# Patient Record
Sex: FEMALE | Race: WHITE | NOT HISPANIC OR LATINO | URBAN - METROPOLITAN AREA
[De-identification: names, ages, dates, MRNs, and addresses within clinical notes are randomized per-mention and may not be internally consistent; named-entity substitution may affect disease eponyms.]

---

## 2020-07-25 ENCOUNTER — TELEPHONE ENCOUNTER (OUTPATIENT)
Dept: URBAN - METROPOLITAN AREA CLINIC 13 | Facility: CLINIC | Age: 30
End: 2020-07-25

## 2020-07-25 RX ORDER — BUSPIRONE HYDROCHLORIDE 5 MG/1
TAKE 1 TABLET TWICE DAILY TABLET ORAL
Qty: 60 | Refills: 3 | OUTPATIENT
Start: 2019-10-14 | End: 2019-11-25

## 2020-07-25 RX ORDER — ARIPIPRAZOLE 10 MG/1
TAKE 1 TABLET DAILY TABLET ORAL
Refills: 0 | OUTPATIENT
End: 2019-11-25

## 2020-07-26 ENCOUNTER — TELEPHONE ENCOUNTER (OUTPATIENT)
Dept: URBAN - METROPOLITAN AREA CLINIC 13 | Facility: CLINIC | Age: 30
End: 2020-07-26

## 2020-07-26 RX ORDER — ALPRAZOLAM 0.5 MG
TAKE 1 TABLET EVERY 6 HOURS PRN TABLET ORAL
Qty: 120 | Refills: 1 | Status: ACTIVE | COMMUNITY

## 2020-07-26 RX ORDER — ALPRAZOLAM 0.25 MG/1
TK 1 T PO QD PRF ANXIETY OR PANIC TABLET ORAL
Qty: 10 | Refills: 0 | Status: ACTIVE | COMMUNITY
Start: 2019-10-16

## 2020-07-26 RX ORDER — BREXPIPRAZOLE 1 MG/1
TAKE 1 TABLET DAILY TABLET ORAL
Refills: 0 | Status: ACTIVE | COMMUNITY

## 2020-07-26 RX ORDER — SERTRALINE HCL 100 MG
TAKE 2 TABLET DAILY TABLET ORAL
Refills: 0 | Status: ACTIVE | COMMUNITY

## 2020-07-26 RX ORDER — ARIPIPRAZOLE 2 MG/1
TK 1 T PO HS FOR 7 DAYS THEN STOP TABLET ORAL
Qty: 7 | Refills: 0 | Status: ACTIVE | COMMUNITY
Start: 2019-10-16

## 2020-07-26 RX ORDER — NORETHINDRONE ACETATE AND ETHINYL ESTRADIOL AND FERROUS FUMARATE 1MG-20(21)
TK 1 T PO QD KIT ORAL
Qty: 84 | Refills: 0 | Status: ACTIVE | COMMUNITY
Start: 2019-05-16

## 2020-07-26 RX ORDER — BUSPIRONE HYDROCHLORIDE 15 MG/1
TAKE 1 TABLET TWICE DAILY TABLET ORAL
Qty: 60 | Refills: 2 | Status: ACTIVE | COMMUNITY

## 2020-07-26 RX ORDER — NORETHINDRONE ACETATE/ETHINYL ESTRADIOL 1.5-0.03MG
TAKE 1 TABLET DAILY AS DIRECTED KIT ORAL
Refills: 0 | Status: ACTIVE | COMMUNITY

## 2020-07-26 RX ORDER — ONDANSETRON 4 MG/1
TAKE 1 TABLET EVERY 8 HOURS PRN NAUSEA TABLET, ORALLY DISINTEGRATING ORAL
Qty: 28 | Refills: 1 | Status: ACTIVE | COMMUNITY
Start: 2019-11-25

## 2021-03-25 ENCOUNTER — DASHBOARD ENCOUNTERS (OUTPATIENT)
Age: 31
End: 2021-03-25

## 2021-03-25 ENCOUNTER — WEB ENCOUNTER (OUTPATIENT)
Dept: URBAN - METROPOLITAN AREA CLINIC 107 | Facility: CLINIC | Age: 31
End: 2021-03-25

## 2021-03-25 ENCOUNTER — OFFICE VISIT (OUTPATIENT)
Dept: URBAN - METROPOLITAN AREA CLINIC 107 | Facility: CLINIC | Age: 31
End: 2021-03-25
Payer: COMMERCIAL

## 2021-03-25 VITALS
DIASTOLIC BLOOD PRESSURE: 112 MMHG | WEIGHT: 293 LBS | HEART RATE: 95 BPM | SYSTOLIC BLOOD PRESSURE: 169 MMHG | RESPIRATION RATE: 20 BRPM | TEMPERATURE: 98.4 F | HEIGHT: 70 IN | BODY MASS INDEX: 41.95 KG/M2

## 2021-03-25 DIAGNOSIS — R10.84 GENERALIZED ABDOMINAL PAIN: ICD-10-CM

## 2021-03-25 DIAGNOSIS — R19.7 DIARRHEA, UNSPECIFIED TYPE: ICD-10-CM

## 2021-03-25 DIAGNOSIS — K56.7 ILEUS: ICD-10-CM

## 2021-03-25 PROCEDURE — 99213 OFFICE O/P EST LOW 20 MIN: CPT | Performed by: INTERNAL MEDICINE

## 2021-03-25 RX ORDER — ONDANSETRON 4 MG/1
TAKE 1 TABLET EVERY 8 HOURS PRN NAUSEA TABLET, ORALLY DISINTEGRATING ORAL
Qty: 28 | Refills: 1 | Status: ON HOLD | COMMUNITY
Start: 2019-11-25

## 2021-03-25 RX ORDER — BREXPIPRAZOLE 1 MG/1
TAKE 1 TABLET DAILY TABLET ORAL
Refills: 0 | Status: ON HOLD | COMMUNITY

## 2021-03-25 RX ORDER — ALPRAZOLAM 0.25 MG/1
TK 1 T PO QD PRF ANXIETY OR PANIC TABLET ORAL
Qty: 10 | Refills: 0 | Status: ON HOLD | COMMUNITY
Start: 2019-10-16

## 2021-03-25 RX ORDER — NORETHINDRONE ACETATE/ETHINYL ESTRADIOL 1.5-0.03MG
TAKE 1 TABLET DAILY AS DIRECTED KIT ORAL
Refills: 0 | Status: ACTIVE | COMMUNITY

## 2021-03-25 RX ORDER — ALPRAZOLAM 0.5 MG
TAKE 1 TABLET EVERY 6 HOURS PRN TABLET ORAL
Qty: 120 | Refills: 1 | Status: ON HOLD | COMMUNITY

## 2021-03-25 RX ORDER — SERTRALINE HCL 100 MG
TAKE 2 TABLET DAILY TABLET ORAL
Refills: 0 | Status: ACTIVE | COMMUNITY

## 2021-03-25 RX ORDER — ARIPIPRAZOLE 2 MG/1
TK 1 T PO HS FOR 7 DAYS THEN STOP TABLET ORAL
Qty: 7 | Refills: 0 | Status: ON HOLD | COMMUNITY
Start: 2019-10-16

## 2021-03-25 RX ORDER — BUSPIRONE HYDROCHLORIDE 15 MG/1
TAKE 1 TABLET TWICE DAILY TABLET ORAL
Qty: 60 | Refills: 2 | Status: ACTIVE | COMMUNITY

## 2021-03-25 RX ORDER — SODIUM PICOSULFATE, MAGNESIUM OXIDE, AND ANHYDROUS CITRIC ACID 10; 3.5; 12 MG/160ML; G/160ML; G/160ML
160 ML LIQUID ORAL ONCE
Qty: 1 | Refills: 0 | OUTPATIENT
Start: 2021-03-25 | End: 2021-03-26

## 2021-03-25 RX ORDER — NORETHINDRONE ACETATE AND ETHINYL ESTRADIOL AND FERROUS FUMARATE 1MG-20(21)
TK 1 T PO QD KIT ORAL
Qty: 84 | Refills: 0 | Status: ON HOLD | COMMUNITY
Start: 2019-05-16

## 2021-03-25 NOTE — HPI-TODAY'S VISIT:
31 y/o female presenting for f/u. She was last seen in the clinic in 2019 by Dr. Cullen. She was treated for functional dyspepsia and was on Buspar. She is here today with c/o diarrhea. She went to the ER last Tuesday because she was having bloating and abdominal pain. She had a CT as well as labs performed. Her CT scan did show a mild GI stasis pattern. Her labs did show a very mild leukocytosis.   She continues to have intermittent diarrhea and abdominal pain.   11/2019 Ms. Chavez is a 29-year-old female with a history of intermittent postprandial nausea and vomiting, bloating, and excessive belching presenting for follow-up. She reported a one-year history of the aforementioned symptoms. She denied heartburn or any other abdominal symptoms. She attributed her symptoms to increased anxiety. She reported a prior evaluation with a normal ultrasound, HIDA scan, an EGD while living in [place]. Functional dyspepsia was suspected. Prior records were requested. She was prescribed BuSpar. Records were received from [place] revealing an unremarkable EGD, HIDA scan, ultrasound, and CT the abdomen and pelvis without contrast/renal stone protocol at were unremarkable for a GI source of symptoms. She has been compliant with BuSpar. In the interval between visits, she has established care with a primary care provider and with psychiatry. Her psychiatrist increased BuSpar to 15 mg daily, continued Zoloft, discontinued Abilify, and started Rexulti. Nausea has been persistent. She reports some postprandial nausea 5-6 days per week and vomits food 3 times a week. She denies any other abdominal symptoms. Her primary care provider has suggested a gastric emptying study.

## 2021-03-31 ENCOUNTER — TELEPHONE ENCOUNTER (OUTPATIENT)
Dept: URBAN - METROPOLITAN AREA CLINIC 113 | Facility: CLINIC | Age: 31
End: 2021-03-31

## 2021-04-22 ENCOUNTER — OFFICE VISIT (OUTPATIENT)
Dept: URBAN - METROPOLITAN AREA SURGERY CENTER 25 | Facility: SURGERY CENTER | Age: 31
End: 2021-04-22

## 2021-05-07 ENCOUNTER — OFFICE VISIT (OUTPATIENT)
Dept: URBAN - METROPOLITAN AREA CLINIC 107 | Facility: CLINIC | Age: 31
End: 2021-05-07